# Patient Record
Sex: FEMALE | Race: WHITE
[De-identification: names, ages, dates, MRNs, and addresses within clinical notes are randomized per-mention and may not be internally consistent; named-entity substitution may affect disease eponyms.]

---

## 2017-10-13 ENCOUNTER — HOSPITAL ENCOUNTER (EMERGENCY)
Dept: HOSPITAL 17 - PHED | Age: 27
LOS: 1 days | Discharge: HOME | End: 2017-10-14
Payer: SELF-PAY

## 2017-10-13 VITALS
DIASTOLIC BLOOD PRESSURE: 72 MMHG | TEMPERATURE: 97.9 F | HEART RATE: 95 BPM | OXYGEN SATURATION: 99 % | RESPIRATION RATE: 16 BRPM | SYSTOLIC BLOOD PRESSURE: 123 MMHG

## 2017-10-13 VITALS
OXYGEN SATURATION: 99 % | RESPIRATION RATE: 16 BRPM | DIASTOLIC BLOOD PRESSURE: 72 MMHG | TEMPERATURE: 97.9 F | HEART RATE: 95 BPM | SYSTOLIC BLOOD PRESSURE: 123 MMHG

## 2017-10-13 VITALS — HEIGHT: 67 IN | WEIGHT: 209.88 LBS | BODY MASS INDEX: 32.94 KG/M2

## 2017-10-13 DIAGNOSIS — E87.6: ICD-10-CM

## 2017-10-13 DIAGNOSIS — G43.909: Primary | ICD-10-CM

## 2017-10-13 LAB
BACTERIA #/AREA URNS HPF: (no result) /HPF
COLOR UR: YELLOW
COMMENT (UR): (no result)
CULTURE IF INDICATED: (no result)
GLUCOSE UR STRIP-MCNC: (no result) MG/DL
HGB UR QL STRIP: (no result)
KETONES UR STRIP-MCNC: 15 MG/DL
LEUKOCYTE ESTERASE UR QL STRIP: (no result) /HPF (ref 0–5)
NITRITE UR QL STRIP: (no result)
RBC #/AREA URNS HPF: (no result) /HPF (ref 0–3)
SP GR UR STRIP: 1.02 (ref 1–1.03)
SQUAMOUS #/AREA URNS HPF: (no result) /HPF (ref 0–5)

## 2017-10-13 PROCEDURE — 96375 TX/PRO/DX INJ NEW DRUG ADDON: CPT

## 2017-10-13 PROCEDURE — 70450 CT HEAD/BRAIN W/O DYE: CPT

## 2017-10-13 PROCEDURE — 99285 EMERGENCY DEPT VISIT HI MDM: CPT

## 2017-10-13 PROCEDURE — 85025 COMPLETE CBC W/AUTO DIFF WBC: CPT

## 2017-10-13 PROCEDURE — 96372 THER/PROPH/DIAG INJ SC/IM: CPT

## 2017-10-13 PROCEDURE — 83690 ASSAY OF LIPASE: CPT

## 2017-10-13 PROCEDURE — 80053 COMPREHEN METABOLIC PANEL: CPT

## 2017-10-13 PROCEDURE — 96374 THER/PROPH/DIAG INJ IV PUSH: CPT

## 2017-10-13 PROCEDURE — 84703 CHORIONIC GONADOTROPIN ASSAY: CPT

## 2017-10-13 PROCEDURE — 81001 URINALYSIS AUTO W/SCOPE: CPT

## 2017-10-13 NOTE — PD
HPI


Chief Complaint:  Headache, N/V/D


Time Seen by Provider:  22:59


Travel History


International Travel<30 days:  No


Contact w/Intl Traveler<30days:  No


Traveled to known affect area:  No





History of Present Illness


HPI


Patient is a 26 year old female presents to the er with headache, nausea, 

vomiting and diarrhea for the past 24 hours.  States she's had headaches like 

this before.  SHe's tried ibuprofen without relief.  Denies visual difficulty, 

focalized weakness. Denies abdominal pain, VB/VD.  Denies blood in stool or 

emesis. Denies thunderclap presentation.  She states symptoms are severe and 

gradually worsening.  associated signs and symptoms as above, duration as above.





PFSH


Past Medical History


Asthma:  Yes


Diminished Hearing:  No


Gastrointestinal Disorders:  Yes (COLITIS)


Respiratory:  Yes (asthma)


Migraines:  Yes


Pregnant?:  Not Pregnant


LMP:  09/15/2017


Ectopic Pregnancy:  Yes (right salpingectomy)


Tubal Ligation:  Yes





Past Surgical History


Other Surgery:  Yes (UPPER MAXILLA FACIAL)





Social History


Alcohol Use:  No


Tobacco Use:  No


Substance Use:  No





Allergies-Medications


(Allergen,Severity, Reaction):  


Coded Allergies:  


     penicillin G (Unverified  Allergy, Severe, HIVES, 10/13/17)


     latex (Unverified  Allergy, Unknown, 10/13/17)


Reported Meds & Prescriptions





Reported Meds & Active Scripts


Active


Reported


Ventolin Hfa 18 GM Inh (Albuterol Sulfate) 90 Mcg/Act Aer 2 Puff INH Q4-6H PRN








Review of Systems


Except as stated in HPI:  all other systems reviewed are Neg





Physical Exam


Narrative


GENERAL: WD/WN in moderate discomfort.


SKIN: Warm and dry.


HEAD: Atraumatic. Normocephalic. 


EYES: Pupils equal and round. No scleral icterus. No injection or drainage. 


ENT: No nasal bleeding or discharge.  Mucous membranes pink and moist.


NECK: Trachea midline. No JVD. 


CARDIOVASCULAR: Regular rate and rhythm.  


RESPIRATORY: No accessory muscle use. Clear to auscultation. Breath sounds 

equal bilaterally. 


GASTROINTESTINAL: Abdomen soft, non-tender, nondistended. Hepatic and splenic 

margins not palpable. No cva tenderness.


MUSCULOSKELETAL: Extremities without clubbing, cyanosis, or edema. No obvious 

deformities. 


NEUROLOGICAL: Awake and alert. CN ii-xii intact.  5/5 strength in all four 

extremities.  Cerebellar testing negative.


PSYCHIATRIC: Appropriate mood and affect; insight and judgment normal.





Data


Data


Last Documented VS





Vital Signs








  Date Time  Temp Pulse Resp B/P (MAP) Pulse Ox O2 Delivery O2 Flow Rate FiO2


 


10/13/17 23:15   18     


 


10/13/17 23:10 97.9 95  123/72 (89) 99   








Orders





 Orders


Urinalysis - C+S If Indicated (10/13/17 22:59)


Ed Urine Pregnancytest Poc (10/13/17 22:59)


Ecg Monitoring (10/13/17 23:22)


Iv Access Insert/Monitor (10/13/17 23:22)


Oximetry (10/13/17 23:22)


Sodium Chloride 0.9% Flush (Ns Flush) (10/13/17 23:30)


Diphenhydramine Inj (Benadryl Inj) (10/13/17 23:30)


Metoclopramide Inj (Reglan Inj) (10/13/17 23:30)


Ct Brain W/O Iv Contrast(Rout) (10/14/17 )


Complete Blood Count With Diff (10/14/17 00:17)


Comprehensive Metabolic Panel (10/14/17 00:17)


Lipase (10/14/17 00:17)


Sodium Chloride 0.9% Flush (Ns Flush) (10/14/17 00:30)


Acet-Butal-Caff 325-50-40 Mg (Fioricet 3 (10/14/17 00:30)


Sumatriptan Inj (Imitrex Inj) (10/14/17 00:30)





Labs





Laboratory Tests








Test


  10/13/17


23:19 10/14/17


00:25


 


Urine Color YELLOW  


 


Urine Turbidity CLEAR  


 


Urine pH 6.5  


 


Urine Specific Gravity 1.025  


 


Urine Protein NEG mg/dL  


 


Urine Glucose (UA) NEG mg/dL  


 


Urine Ketones 15 mg/dL  


 


Urine Occult Blood MOD  


 


Urine Nitrite NEG  


 


Urine Bilirubin NEG  


 


Urine Leukocyte Esterase NEG  


 


Urine RBC 3-5 /hpf  


 


Urine WBC 0-2 /hpf  


 


Urine Squamous Epithelial


Cells 6-8 /hpf 


  


 


 


Urine Bacteria OCC /hpf  


 


Microscopic Urinalysis Comment


  CULT NOT


INDICATED 


 


 


White Blood Count  7.5 TH/MM3 


 


Red Blood Count  4.84 MIL/MM3 


 


Hemoglobin  14.2 GM/DL 


 


Hematocrit  41.8 % 


 


Mean Corpuscular Volume  86.5 FL 


 


Mean Corpuscular Hemoglobin  29.3 PG 


 


Mean Corpuscular Hemoglobin


Concent 


  33.9 % 


 


 


Red Cell Distribution Width  12.0 % 


 


Platelet Count  256 TH/MM3 


 


Mean Platelet Volume  8.9 FL 


 


Neutrophils (%) (Auto)  69.7 % 


 


Lymphocytes (%) (Auto)  22.2 % 


 


Monocytes (%) (Auto)  6.4 % 


 


Eosinophils (%) (Auto)  1.2 % 


 


Basophils (%) (Auto)  0.5 % 


 


Neutrophils # (Auto)  5.2 TH/MM3 


 


Lymphocytes # (Auto)  1.7 TH/MM3 


 


Monocytes # (Auto)  0.5 TH/MM3 


 


Eosinophils # (Auto)  0.1 TH/MM3 


 


Basophils # (Auto)  0.0 TH/MM3 


 


CBC Comment  DIFF FINAL 


 


Differential Comment   


 


Blood Urea Nitrogen  10 MG/DL 


 


Creatinine  0.88 MG/DL 


 


Random Glucose  119 MG/DL 


 


Total Protein  7.7 GM/DL 


 


Albumin  4.1 GM/DL 


 


Calcium Level  8.4 MG/DL 


 


Alkaline Phosphatase  87 U/L 


 


Aspartate Amino Transf


(AST/SGOT) 


  12 U/L 


 


 


Alanine Aminotransferase


(ALT/SGPT) 


  21 U/L 


 


 


Total Bilirubin  0.5 MG/DL 


 


Sodium Level  138 MEQ/L 


 


Potassium Level  3.2 MEQ/L 


 


Chloride Level  105 MEQ/L 


 


Carbon Dioxide Level  24.0 MEQ/L 


 


Anion Gap  9 MEQ/L 


 


Estimat Glomerular Filtration


Rate 


  77 ML/MIN 


 


 


Lipase  151 U/L 











MDM


Medical Decision Making


Medical Screen Exam Complete:  Yes


Emergency Medical Condition:  Yes


Differential Diagnosis


Recurrent headache, migraine, cluster, acute intracranial pathology unlikely, 

pregnancy, gastritis, gastroenteritis, electrolyte abnormality.


Narrative Course


Patient roomed in ed, given reglan, phenergan benadryl.  No relief in symptoms.

  CT head ordered, basic labs and additional medication.  Discussed with Dr. Johnson at 0030 at the end of my shift to follow up workup reassess patient and 

disposition appropriately.





Patient low risk for spontaneous SAH, no nuchal rigidty and no fever.











Keven Arellano MD Oct 13, 2017 23:15

## 2017-10-14 VITALS — DIASTOLIC BLOOD PRESSURE: 70 MMHG | SYSTOLIC BLOOD PRESSURE: 113 MMHG

## 2017-10-14 VITALS
HEART RATE: 91 BPM | SYSTOLIC BLOOD PRESSURE: 135 MMHG | OXYGEN SATURATION: 95 % | DIASTOLIC BLOOD PRESSURE: 64 MMHG | RESPIRATION RATE: 18 BRPM

## 2017-10-14 VITALS
OXYGEN SATURATION: 95 % | RESPIRATION RATE: 18 BRPM | HEART RATE: 74 BPM | SYSTOLIC BLOOD PRESSURE: 152 MMHG | DIASTOLIC BLOOD PRESSURE: 88 MMHG

## 2017-10-14 VITALS — RESPIRATION RATE: 16 BRPM

## 2017-10-14 LAB
ALP SERPL-CCNC: 87 U/L (ref 45–117)
ALT SERPL-CCNC: 21 U/L (ref 10–53)
ANION GAP SERPL CALC-SCNC: 9 MEQ/L (ref 5–15)
AST SERPL-CCNC: 12 U/L (ref 15–37)
BASOPHILS # BLD AUTO: 0 TH/MM3 (ref 0–0.2)
BASOPHILS NFR BLD: 0.5 % (ref 0–2)
BILIRUB SERPL-MCNC: 0.5 MG/DL (ref 0.2–1)
BUN SERPL-MCNC: 10 MG/DL (ref 7–18)
CHLORIDE SERPL-SCNC: 105 MEQ/L (ref 98–107)
EOSINOPHIL # BLD: 0.1 TH/MM3 (ref 0–0.4)
EOSINOPHIL NFR BLD: 1.2 % (ref 0–4)
ERYTHROCYTE [DISTWIDTH] IN BLOOD BY AUTOMATED COUNT: 12 % (ref 11.6–17.2)
GFR SERPLBLD BASED ON 1.73 SQ M-ARVRAT: 77 ML/MIN (ref 89–?)
HCO3 BLD-SCNC: 24 MEQ/L (ref 21–32)
HCT VFR BLD CALC: 41.8 % (ref 35–46)
HEMO FLAGS: (no result)
LYMPHOCYTES # BLD AUTO: 1.7 TH/MM3 (ref 1–4.8)
LYMPHOCYTES NFR BLD AUTO: 22.2 % (ref 9–44)
MCH RBC QN AUTO: 29.3 PG (ref 27–34)
MCHC RBC AUTO-ENTMCNC: 33.9 % (ref 32–36)
MCV RBC AUTO: 86.5 FL (ref 80–100)
MONOCYTES NFR BLD: 6.4 % (ref 0–8)
NEUTROPHILS # BLD AUTO: 5.2 TH/MM3 (ref 1.8–7.7)
NEUTROPHILS NFR BLD AUTO: 69.7 % (ref 16–70)
PLATELET # BLD: 256 TH/MM3 (ref 150–450)
POTASSIUM SERPL-SCNC: 3.2 MEQ/L (ref 3.5–5.1)
RBC # BLD AUTO: 4.84 MIL/MM3 (ref 4–5.3)
SODIUM SERPL-SCNC: 138 MEQ/L (ref 136–145)
WBC # BLD AUTO: 7.5 TH/MM3 (ref 4–11)

## 2017-10-14 NOTE — PD
Physical Exam


Narrative


Received sign out to follow up CT brain, labs and reevaluate patient.





25yo F with long history of migraine here with headache similar to her 

migraine.  Pt was given reglan, diphenhydramine but headache did not improve so 

fioricet and sumatriptan given as well.  Labs reviewed, no leukocytosis.  Mild 

hypokalemia, replaced orally.  Lipase normal.  UA showed no leukocyte.  Culture 

not indicated.  CT brain negative.  Pt reevaluated at bedside and said her 

headache improved.  Nausea is gone as well.  Said she was given fioricet 

prescription from ED last time and it helped.  Currently does not have PMD or 

neurologist.  Will give a few doses as prescription and have her follow up with 

[facility].  Return precautions given.





Data


Data


Last Documented VS





Vital Signs








  Date Time  Temp Pulse Resp B/P (MAP) Pulse Ox O2 Delivery O2 Flow Rate FiO2


 


10/13/17 23:15   18     


 


10/13/17 23:10 97.9 95  123/72 (89) 99   








Orders





 Orders


Urinalysis - C+S If Indicated (10/13/17 22:59)


Ed Urine Pregnancytest Poc (10/13/17 22:59)


Ecg Monitoring (10/13/17 23:22)


Iv Access Insert/Monitor (10/13/17 23:22)


Oximetry (10/13/17 23:22)


Sodium Chloride 0.9% Flush (Ns Flush) (10/13/17 23:30)


Diphenhydramine Inj (Benadryl Inj) (10/13/17 23:30)


Metoclopramide Inj (Reglan Inj) (10/13/17 23:30)


Ct Brain W/O Iv Contrast(Rout) (10/14/17 )


Complete Blood Count With Diff (10/14/17 00:17)


Comprehensive Metabolic Panel (10/14/17 00:17)


Lipase (10/14/17 00:17)


Sodium Chloride 0.9% Flush (Ns Flush) (10/14/17 00:30)


Acet-Butal-Caff 325-50-40 Mg (Fioricet 3 (10/14/17 00:30)


Sumatriptan Inj (Imitrex Inj) (10/14/17 00:30)


Potassium Chloride (Kcl) (10/14/17 01:45)





Labs





Laboratory Tests








Test


  10/13/17


23:19 10/14/17


00:25


 


Urine Color YELLOW  


 


Urine Turbidity CLEAR  


 


Urine pH 6.5  


 


Urine Specific Gravity 1.025  


 


Urine Protein NEG mg/dL  


 


Urine Glucose (UA) NEG mg/dL  


 


Urine Ketones 15 mg/dL  


 


Urine Occult Blood MOD  


 


Urine Nitrite NEG  


 


Urine Bilirubin NEG  


 


Urine Leukocyte Esterase NEG  


 


Urine RBC 3-5 /hpf  


 


Urine WBC 0-2 /hpf  


 


Urine Squamous Epithelial


Cells 6-8 /hpf 


  


 


 


Urine Bacteria OCC /hpf  


 


Microscopic Urinalysis Comment


  CULT NOT


INDICATED 


 


 


White Blood Count  7.5 TH/MM3 


 


Red Blood Count  4.84 MIL/MM3 


 


Hemoglobin  14.2 GM/DL 


 


Hematocrit  41.8 % 


 


Mean Corpuscular Volume  86.5 FL 


 


Mean Corpuscular Hemoglobin  29.3 PG 


 


Mean Corpuscular Hemoglobin


Concent 


  33.9 % 


 


 


Red Cell Distribution Width  12.0 % 


 


Platelet Count  256 TH/MM3 


 


Mean Platelet Volume  8.9 FL 


 


Neutrophils (%) (Auto)  69.7 % 


 


Lymphocytes (%) (Auto)  22.2 % 


 


Monocytes (%) (Auto)  6.4 % 


 


Eosinophils (%) (Auto)  1.2 % 


 


Basophils (%) (Auto)  0.5 % 


 


Neutrophils # (Auto)  5.2 TH/MM3 


 


Lymphocytes # (Auto)  1.7 TH/MM3 


 


Monocytes # (Auto)  0.5 TH/MM3 


 


Eosinophils # (Auto)  0.1 TH/MM3 


 


Basophils # (Auto)  0.0 TH/MM3 


 


CBC Comment  DIFF FINAL 


 


Differential Comment   


 


Blood Urea Nitrogen  10 MG/DL 


 


Creatinine  0.88 MG/DL 


 


Random Glucose  119 MG/DL 


 


Total Protein  7.7 GM/DL 


 


Albumin  4.1 GM/DL 


 


Calcium Level  8.4 MG/DL 


 


Alkaline Phosphatase  87 U/L 


 


Aspartate Amino Transf


(AST/SGOT) 


  12 U/L 


 


 


Alanine Aminotransferase


(ALT/SGPT) 


  21 U/L 


 


 


Total Bilirubin  0.5 MG/DL 


 


Sodium Level  138 MEQ/L 


 


Potassium Level  3.2 MEQ/L 


 


Chloride Level  105 MEQ/L 


 


Carbon Dioxide Level  24.0 MEQ/L 


 


Anion Gap  9 MEQ/L 


 


Estimat Glomerular Filtration


Rate 


  77 ML/MIN 


 


 


Lipase  151 U/L 











[facility]


Supervised Visit with NE:  No


Diagnosis





 Primary Impression:  


 Migraine


 Qualified Codes:  G43.909 - Migraine, unspecified, not intractable, without 

status migrainosus


Patient Instructions:  General Instructions


Departure Forms:  Tests/Procedures





***Additional Instruction:  


Please follow up with [facility] in 2-3 days.  Return to the ED if 

symptoms worsen.


***Med/Other Pt SpecificInfo:  Prescription(s) given


Scripts


Butalbital-Acetaminophen-Caffeine (Fioricet) -40 Mg Cap


1 CAP PO Q4H Y for HEADACHE, #10 CAP 0 Refills


   Prov: Swetha Johnson DO         10/14/17


Disposition:  01 DISCHARGE HOME


Condition:  Stable











Swetha Johnson DO Oct 14, 2017 01:50

## 2017-10-14 NOTE — RADRPT
EXAM DATE/TIME:  10/14/2017 00:27 

 

[facility] COMPARISON:     

CT BRAIN W/O CONTRAST, June 30, 2017, 10:23.

 

 

INDICATIONS :     

Cephalgia. Migraine symptoms.

                      

 

RADIATION DOSE:     

55.62 CTDIvol (mGy) 

 

 

 

MEDICAL HISTORY :       

Migraines, asthma

 

SURGICAL HISTORY :      

Tubal ligation. upper maxilla surgery

 

ENCOUNTER:      

Initial

 

ACUITY:      

1 day

 

PAIN SCALE:      

10/10

 

LOCATION:        

cranial 

 

TECHNIQUE:     

Multiple contiguous axial images were obtained of the head.  Using automated exposure control and adj
ustment of the mA and/or kV according to patient size, radiation dose was kept as low as reasonably a
chievable to obtain optimal diagnostic quality images.   DICOM format image data is available electro
nically for review and comparison.  

 

FINDINGS:     

 

CEREBRUM:     

The ventricles are normal for age.  No evidence of midline shift, mass lesion, hemorrhage or acute in
farction.  No extra-axial fluid collections are seen.

 

POSTERIOR FOSSA:     

The cerebellum and brainstem are intact.  The 4th ventricle is midline.  The cerebellopontine angle i
s unremarkable.

 

EXTRACRANIAL:     

The visualized portion of the orbits is intact.

 

SKULL:     

The calvaria is intact.  No evidence of skull fracture.

 

CONCLUSION:     

Normal examination.  

 

 

 

 Dany Veras MD on October 14, 2017 at 0:44           

Board Certified Radiologist.

 This report was verified electronically.

## 2018-06-22 ENCOUNTER — HOSPITAL ENCOUNTER (EMERGENCY)
Dept: HOSPITAL 17 - PHED | Age: 28
Discharge: HOME | End: 2018-06-22
Payer: COMMERCIAL

## 2018-06-22 VITALS
SYSTOLIC BLOOD PRESSURE: 140 MMHG | DIASTOLIC BLOOD PRESSURE: 76 MMHG | RESPIRATION RATE: 16 BRPM | OXYGEN SATURATION: 98 % | HEART RATE: 97 BPM | TEMPERATURE: 98.6 F

## 2018-06-22 VITALS — RESPIRATION RATE: 16 BRPM | OXYGEN SATURATION: 98 %

## 2018-06-22 VITALS
RESPIRATION RATE: 16 BRPM | DIASTOLIC BLOOD PRESSURE: 88 MMHG | HEART RATE: 93 BPM | OXYGEN SATURATION: 98 % | SYSTOLIC BLOOD PRESSURE: 151 MMHG

## 2018-06-22 DIAGNOSIS — H53.149: ICD-10-CM

## 2018-06-22 DIAGNOSIS — J45.909: ICD-10-CM

## 2018-06-22 DIAGNOSIS — Z88.5: ICD-10-CM

## 2018-06-22 DIAGNOSIS — Z88.0: ICD-10-CM

## 2018-06-22 DIAGNOSIS — R19.7: ICD-10-CM

## 2018-06-22 DIAGNOSIS — G43.909: Primary | ICD-10-CM

## 2018-06-22 DIAGNOSIS — R11.2: ICD-10-CM

## 2018-06-22 LAB
ALBUMIN SERPL-MCNC: 4.4 GM/DL (ref 3.4–5)
ALP SERPL-CCNC: 88 U/L (ref 45–117)
ALT SERPL-CCNC: 27 U/L (ref 10–53)
AST SERPL-CCNC: 12 U/L (ref 15–37)
BACTERIA #/AREA URNS HPF: (no result) /HPF
BASOPHILS # BLD AUTO: 0 TH/MM3 (ref 0–0.2)
BASOPHILS NFR BLD: 0.2 % (ref 0–2)
BILIRUB SERPL-MCNC: 0.5 MG/DL (ref 0.2–1)
BUN SERPL-MCNC: 8 MG/DL (ref 7–18)
CALCIUM SERPL-MCNC: 9.2 MG/DL (ref 8.5–10.1)
CHLORIDE SERPL-SCNC: 103 MEQ/L (ref 98–107)
COLOR UR: YELLOW
CREAT SERPL-MCNC: 0.91 MG/DL (ref 0.5–1)
EOSINOPHIL # BLD: 0 TH/MM3 (ref 0–0.4)
EOSINOPHIL NFR BLD: 0.2 % (ref 0–4)
ERYTHROCYTE [DISTWIDTH] IN BLOOD BY AUTOMATED COUNT: 13.2 % (ref 11.6–17.2)
GFR SERPLBLD BASED ON 1.73 SQ M-ARVRAT: 74 ML/MIN (ref 89–?)
GLUCOSE SERPL-MCNC: 84 MG/DL (ref 74–106)
GLUCOSE UR STRIP-MCNC: (no result) MG/DL
HCO3 BLD-SCNC: 27.4 MEQ/L (ref 21–32)
HCT VFR BLD CALC: 44.8 % (ref 35–46)
HGB BLD-MCNC: 15.6 GM/DL (ref 11.6–15.3)
HGB UR QL STRIP: (no result)
KETONES UR STRIP-MCNC: (no result) MG/DL
LEUKOCYTE ESTERASE UR QL STRIP: (no result) /HPF (ref 0–5)
LYMPHOCYTES # BLD AUTO: 1.2 TH/MM3 (ref 1–4.8)
LYMPHOCYTES NFR BLD AUTO: 11.8 % (ref 9–44)
MCH RBC QN AUTO: 30.3 PG (ref 27–34)
MCHC RBC AUTO-ENTMCNC: 34.8 % (ref 32–36)
MCV RBC AUTO: 86.9 FL (ref 80–100)
MONOCYTE #: 0.5 TH/MM3 (ref 0–0.9)
MONOCYTES NFR BLD: 5.2 % (ref 0–8)
NEUTROPHILS # BLD AUTO: 8.7 TH/MM3 (ref 1.8–7.7)
NEUTROPHILS NFR BLD AUTO: 82.6 % (ref 16–70)
NITRITE UR QL STRIP: (no result)
PLATELET # BLD: 338 TH/MM3 (ref 150–450)
PMV BLD AUTO: 8.9 FL (ref 7–11)
PROT SERPL-MCNC: 8.7 GM/DL (ref 6.4–8.2)
RBC # BLD AUTO: 5.16 MIL/MM3 (ref 4–5.3)
RBC #/AREA URNS HPF: (no result) /HPF (ref 0–3)
SODIUM SERPL-SCNC: 138 MEQ/L (ref 136–145)
SP GR UR STRIP: 1.01 (ref 1–1.03)
SQUAMOUS #/AREA URNS HPF: (no result) /HPF (ref 0–5)
URINE LEUKOCYTE ESTERASE: (no result)
WBC # BLD AUTO: 10.4 TH/MM3 (ref 4–11)

## 2018-06-22 PROCEDURE — 96361 HYDRATE IV INFUSION ADD-ON: CPT

## 2018-06-22 PROCEDURE — 99284 EMERGENCY DEPT VISIT MOD MDM: CPT

## 2018-06-22 PROCEDURE — 84703 CHORIONIC GONADOTROPIN ASSAY: CPT

## 2018-06-22 PROCEDURE — 80053 COMPREHEN METABOLIC PANEL: CPT

## 2018-06-22 PROCEDURE — 83690 ASSAY OF LIPASE: CPT

## 2018-06-22 PROCEDURE — 81001 URINALYSIS AUTO W/SCOPE: CPT

## 2018-06-22 PROCEDURE — 85025 COMPLETE CBC W/AUTO DIFF WBC: CPT

## 2018-06-22 PROCEDURE — 96374 THER/PROPH/DIAG INJ IV PUSH: CPT

## 2018-06-22 PROCEDURE — 96375 TX/PRO/DX INJ NEW DRUG ADDON: CPT

## 2018-06-22 NOTE — PD
HPI


Chief Complaint:  Headache


Time Seen by Provider:  14:00


Travel History


International Travel<30 days:  No


Contact w/Intl Traveler<30days:  No


Traveled to known affect area:  No





History of Present Illness


HPI


Patient is a 27-year-old female with history of migraines, presents the 

emergency room with complaints of a migraine headache.  Patient reports that 

for the past 2-3 days, she has been having nausea, vomiting and diarrhea.  

Patient denies any abdominal pain or cramping.  Patient reports that she has 

been having problems keeping any fluids.  Patient reports that in addition to 

her GI issues, she has been having migraine headache.  Patient reports that she 

has history of migraines and she does take Fioricet for this, reports that 

since she cannot keep anything down, she cannot get any relief with her 

symptoms.  Patient reports that she has more of a frontal headache which shoots 

to her back of her head.  Reports photophobia with her headache, reports nausea 

and vomiting with her symptoms.  Patient reports that her migraines are similar 

to her previous symptoms, denies any thunderclap headache, reports that this is 

not the worst headache of her life.  Patient reports that she was seen in this 

ER previously and had a CT of the head which was normal, patient requesting 

medications for her migraine headache.  Patient denies any fever or chills, 

denies any chest pain or shortness of breath, patient with no other complaints.





PFSH


Past Medical History


Asthma:  Yes


Diminished Hearing:  No


Gastrointestinal Disorders:  Yes (COLITIS)


Respiratory:  Yes (asthma)


Migraines:  Yes


Influenza Vaccination:  No


Pregnant?:  Not Pregnant


LMP:  6/3/18


Ectopic Pregnancy:  Yes (right salpingectomy)


Tubal Ligation:  Yes





Past Surgical History


Other Surgery:  Yes (UPPER MAXILLA FACIAL)





Social History


Alcohol Use:  Yes (RARELY)


Tobacco Use:  No


Substance Use:  No





Allergies-Medications


(Allergen,Severity, Reaction):  


Coded Allergies:  


     penicillin G (Unverified  Allergy, Severe, HIVES, 6/22/18)


     acetaminophen (Verified  Allergy, Unknown, 6/22/18)


     latex (Unverified  Allergy, Unknown, 6/22/18)


     oxycodone (Verified  Allergy, Unknown, 6/22/18)


Reported Meds & Prescriptions





Reported Meds & Active Scripts


Active








Review of Systems


General / Constitutional:  No: Fever


Eyes:  No: Visual changes


HENT:  Positive: Headaches, No: Neck Stiffness, Neck Pain


Cardiovascular:  No: Chest Pain or Discomfort, Syncope


Respiratory:  No: Shortness of Breath


Gastrointestinal:  Positive: Nausea, Vomiting, Diarrhea, No: Abdominal Pain


Genitourinary:  No: Dysuria


Musculoskeletal:  No: Pain


Skin:  No Rash


Neurologic:  No: Weakness


Psychiatric:  No: Depression


Endocrine:  No: Polydipsia


Hematologic/Lymphatic:  No: Easy Bruising





Physical Exam


Narrative


GENERAL: Mild distress


SKIN: Focused skin assessment warm/dry.


HEAD: Atraumatic. Normocephalic. 


EYES: Pupils equal and round. No scleral icterus. No injection or drainage. 


ENT: No nasal bleeding or discharge.  Mucous membranes pink and moist.


NECK: Trachea midline. No JVD. 


CARDIOVASCULAR: Regular rate and rhythm.  No murmur appreciated.


RESPIRATORY: No accessory muscle use. Clear to auscultation. Breath sounds 

equal bilaterally. 


GASTROINTESTINAL: Abdomen soft, non-tender, nondistended. Hepatic and splenic 

margins not palpable. 


MUSCULOSKELETAL: No obvious deformities. No clubbing.  No cyanosis.  No edema. 


NEUROLOGICAL: Awake and alert. No obvious cranial nerve deficits.  Motor 

grossly within normal limits. Normal speech. CN 2-12 grossly intact with no 

neurological deficits


PSYCHIATRIC: Appropriate mood and affect; insight and judgment normal.





Data


Data


Last Documented VS





Vital Signs








  Date Time  Temp Pulse Resp B/P (MAP) Pulse Ox O2 Delivery O2 Flow Rate FiO2


 


6/22/18 14:17   16  98 Room Air  


 


6/22/18 14:15  93      


 


6/22/18 12:58 98.6       








Orders





 Orders


Complete Blood Count With Diff (6/22/18 14:05)


Comprehensive Metabolic Panel (6/22/18 14:05)


Lipase (6/22/18 14:05)


Urinalysis - C+S If Indicated (6/22/18 14:05)


Iv Access Insert/Monitor (6/22/18 14:05)


Ecg Monitoring (6/22/18 14:05)


Oximetry (6/22/18 14:05)


Sodium Chlor 0.9% 1000 Ml Inj (Ns 1000 M (6/22/18 14:05)


Sodium Chloride 0.9% Flush (Ns Flush) (6/22/18 14:15)


Ed Urine Pregnancytest Poc (6/22/18 14:05)


Ketorolac Inj (Toradol Inj) (6/22/18 14:15)


Diphenhydramine Inj (Benadryl Inj) (6/22/18 14:15)


Metoclopramide Inj (Reglan Inj) (6/22/18 14:15)


Dexamethasone Inj (Decadron Inj) (6/22/18 14:15)


Sodium Chlor 0.9% 1000 Ml Inj (Ns 1000 M (6/22/18 14:15)





Labs





Laboratory Tests








Test


  6/22/18


14:00 6/22/18


14:10


 


Urine Color YELLOW  


 


Urine Turbidity CLEAR  


 


Urine pH 7.5  


 


Urine Specific Gravity 1.015  


 


Urine Protein NEG mg/dL  


 


Urine Glucose (UA) NEG mg/dL  


 


Urine Ketones NEG mg/dL  


 


Urine Occult Blood NEG  


 


Urine Nitrite NEG  


 


Urine Bilirubin NEG  


 


Urine Urobilinogen 0.2 MG/DL  


 


Urine Leukocyte Esterase TRACE  


 


Urine RBC 0-3 /hpf  


 


Urine WBC 3-5 /hpf  


 


Urine Squamous Epithelial


Cells 0-5 /hpf 


  


 


 


Urine Bacteria FEW /hpf  


 


Microscopic Urinalysis Comment


  CULT NOT


INDICATED 


 


 


White Blood Count  10.4 TH/MM3 


 


Red Blood Count  5.16 MIL/MM3 


 


Hemoglobin  15.6 GM/DL 


 


Hematocrit  44.8 % 


 


Mean Corpuscular Volume  86.9 FL 


 


Mean Corpuscular Hemoglobin  30.3 PG 


 


Mean Corpuscular Hemoglobin


Concent 


  34.8 % 


 


 


Red Cell Distribution Width  13.2 % 


 


Platelet Count  338 TH/MM3 


 


Mean Platelet Volume  8.9 FL 


 


Neutrophils (%) (Auto)  82.6 % 


 


Lymphocytes (%) (Auto)  11.8 % 


 


Monocytes (%) (Auto)  5.2 % 


 


Eosinophils (%) (Auto)  0.2 % 


 


Basophils (%) (Auto)  0.2 % 


 


Neutrophils # (Auto)  8.7 TH/MM3 


 


Lymphocytes # (Auto)  1.2 TH/MM3 


 


Monocytes # (Auto)  0.5 TH/MM3 


 


Eosinophils # (Auto)  0.0 TH/MM3 


 


Basophils # (Auto)  0.0 TH/MM3 


 


CBC Comment  AUTO DIFF 


 


Differential Comment


  


  AUTO DIFF


CONFIRMED


 


Platelet Estimate  NORMAL 


 


Platelet Morphology Comment  ENLARGED 


 


Red Cell Morphology Comment  NORMAL 


 


Blood Urea Nitrogen  8 MG/DL 


 


Creatinine  0.91 MG/DL 


 


Random Glucose  84 MG/DL 


 


Total Protein  8.7 GM/DL 


 


Albumin  4.4 GM/DL 


 


Calcium Level  9.2 MG/DL 


 


Alkaline Phosphatase  88 U/L 


 


Aspartate Amino Transf


(AST/SGOT) 


  12 U/L 


 


 


Alanine Aminotransferase


(ALT/SGPT) 


  27 U/L 


 


 


Total Bilirubin  0.5 MG/DL 


 


Sodium Level  138 MEQ/L 


 


Potassium Level  3.5 MEQ/L 


 


Chloride Level  103 MEQ/L 


 


Carbon Dioxide Level  27.4 MEQ/L 


 


Anion Gap  8 MEQ/L 


 


Estimat Glomerular Filtration


Rate 


  74 ML/MIN 


 


 


Lipase  149 U/L 











MDM


Medical Decision Making


Medical Screen Exam Complete:  Yes


Emergency Medical Condition:  Yes


Medical Record Reviewed:  Yes


Interpretation(s)





Vital Signs








  Date Time  Temp Pulse Resp B/P (MAP) Pulse Ox O2 Delivery O2 Flow Rate FiO2


 


6/22/18 12:58 98.6 97 16 140/76 (97) 98   








Differential Diagnosis


Cephalgia, gastritis, gastroenteritis, electrolyte abnormality


Narrative Course


During the course of the patients emergency department visit, the patients 

history, examination, and differential diagnosis were reviewed with the 

patient. The patient was placed on a cardiac monitor with oximetry and frequent 

blood pressure monitoring. The patient had an IV access obtained and blood work 

sent for analysis. 





The patient was initially provided migraine cocktail, patient with benign 

neurological exam, her headache is similar to her past migraine headache she 

had in the past.  She did have a recent CT of her head on October 14, 2017 

which was benign.  Patient did not require repeat CAT scan of her head as her 

symptoms are similar and she has a normal neurological exam.





The patients laboratory studies were reviewed and remarkable for 








Laboratory Tests








Test


  6/22/18


14:00 6/22/18


14:10


 


Urine Color


  YELLOW


(YELLW/STRAW) 


 


 


Urine Turbidity CLEAR (CLEAR)  


 


Urine pH 7.5 (5.0-8.5)  


 


Urine Specific Gravity


  1.015


(1.002-1.035) 


 


 


Urine Protein


  NEG mg/dL


(NEG-TRACE) 


 


 


Urine Glucose (UA)


  NEG mg/dL


(NEG) 


 


 


Urine Ketones


  NEG mg/dL


(NEG) 


 


 


Urine Occult Blood NEG (NEG)  


 


Urine Nitrite NEG (NEG)  


 


Urine Bilirubin NEG (NEG)  


 


Urine Urobilinogen


  0.2 MG/DL


(LESS THAN 


 


 


Urine Leukocyte Esterase TRACE (NEG)  


 


Urine RBC 0-3 /hpf (0-3)  


 


Urine WBC 3-5 /hpf (0-5)  


 


Urine Squamous Epithelial


Cells 0-5 /hpf (0-5) 


  


 


 


Urine Bacteria


  FEW /hpf


(NONE) 


 


 


Microscopic Urinalysis Comment


  CULT NOT


INDICATED 


 


 


White Blood Count


  


  10.4 TH/MM3


(4.0-11.0)


 


Red Blood Count


  


  5.16 MIL/MM3


(4.00-5.30)


 


Hemoglobin


  


  15.6 GM/DL


(11.6-15.3)


 


Hematocrit


  


  44.8 %


(35.0-46.0)


 


Mean Corpuscular Volume


  


  86.9 FL


(80.0-100.0)


 


Mean Corpuscular Hemoglobin


  


  30.3 PG


(27.0-34.0)


 


Mean Corpuscular Hemoglobin


Concent 


  34.8 %


(32.0-36.0)


 


Red Cell Distribution Width


  


  13.2 %


(11.6-17.2)


 


Platelet Count


  


  338 TH/MM3


(150-450)


 


Mean Platelet Volume


  


  8.9 FL


(7.0-11.0)


 


Neutrophils (%) (Auto)


  


  82.6 %


(16.0-70.0)


 


Lymphocytes (%) (Auto)


  


  11.8 %


(9.0-44.0)


 


Monocytes (%) (Auto)


  


  5.2 %


(0.0-8.0)


 


Eosinophils (%) (Auto)


  


  0.2 %


(0.0-4.0)


 


Basophils (%) (Auto)


  


  0.2 %


(0.0-2.0)


 


Neutrophils # (Auto)


  


  8.7 TH/MM3


(1.8-7.7)


 


Lymphocytes # (Auto)


  


  1.2 TH/MM3


(1.0-4.8)


 


Monocytes # (Auto)


  


  0.5 TH/MM3


(0-0.9)


 


Eosinophils # (Auto)


  


  0.0 TH/MM3


(0-0.4)


 


Basophils # (Auto)


  


  0.0 TH/MM3


(0-0.2)


 


CBC Comment  AUTO DIFF 


 


Differential Comment


  


  AUTO DIFF


CONFIRMED


 


Platelet Estimate


  


  NORMAL


(NORMAL)


 


Platelet Morphology Comment


  


  ENLARGED


(NORMAL)


 


Red Cell Morphology Comment


  


  NORMAL


(NORMAL)


 


Blood Urea Nitrogen  8 MG/DL (7-18) 


 


Creatinine


  


  0.91 MG/DL


(0.50-1.00)


 


Random Glucose


  


  84 MG/DL


()


 


Total Protein


  


  8.7 GM/DL


(6.4-8.2)


 


Albumin


  


  4.4 GM/DL


(3.4-5.0)


 


Calcium Level


  


  9.2 MG/DL


(8.5-10.1)


 


Alkaline Phosphatase


  


  88 U/L


()


 


Aspartate Amino Transf


(AST/SGOT) 


  12 U/L (15-37) 


 


 


Alanine Aminotransferase


(ALT/SGPT) 


  27 U/L (10-53) 


 


 


Total Bilirubin


  


  0.5 MG/DL


(0.2-1.0)


 


Sodium Level


  


  138 MEQ/L


(136-145)


 


Potassium Level


  


  3.5 MEQ/L


(3.5-5.1)


 


Chloride Level


  


  103 MEQ/L


()


 


Carbon Dioxide Level


  


  27.4 MEQ/L


(21.0-32.0)


 


Anion Gap  8 MEQ/L (5-15) 


 


Estimat Glomerular Filtration


Rate 


  74 ML/MIN


(>89)


 


Lipase


  


  149 U/L


()











Patient reevaluated, patient reports complete resolution of symptoms at this 

time.  Patient with a normal neurological exam, all labs were reviewed with the 

patient, she will follow with neurologist and will return to the emergency room 

as needed.





Diagnosis





 Primary Impression:  


 Cephalgia


 Qualified Codes:  R51 - Headache


Patient Instructions:  General Instructions





***Additional Instructions:  





Please follow up with your primary care doctor in 2-3 days





Return to the ER if symptoms worsen or progress





Return to the ER as needed


Disposition:  01 DISCHARGE HOME


Condition:  Stable











Rocio Cobb DO Jun 22, 2018 14:16